# Patient Record
(demographics unavailable — no encounter records)

---

## 2025-01-30 NOTE — HISTORY OF PRESENT ILLNESS
[FreeTextEntry1] : Patient was going for fertility treatment, she is continued Metformin ER 750mg bid.  she is taking prenatal vitamins. she said she had she had IUI x 2 in October & November, but did not get pregrant. She said she had also ovary cyst on sonogram in November 2024.  she call , she is not feeling well and changed to phone visit today. She had recent lab test done in [facility], she wants to gov over her results. She said she had lost 10 pounds and curretnly 284 pounds. Patient just did lab recently, but still pending. Will make follow up phone appointment this week.

## 2025-01-30 NOTE — HISTORY OF PRESENT ILLNESS
[FreeTextEntry1] : Patient was going for fertility treatment, she is continued Metformin ER 750mg bid.  she is taking prenatal vitamins. she said she had she had IUI x 2 in October & November, but did not get pregrant. She said she had also ovary cyst on sonogram in November 2024.  she call , she is not feeling well and changed to phone visit today. She had recent lab test done in [facility], she wants to gov over her results. She said she had lost 10 pounds and curretnly 284 pounds. Patient just did lab recently, but still pending. Will make follow up phone appointment this week.

## 2025-01-30 NOTE — HISTORY OF PRESENT ILLNESS
[FreeTextEntry1] : Patient was going for fertility treatment, she is continued Metformin ER 750mg bid.  she is taking prenatal vitamins. she said she had she had IUI x 2 in October & November, but did not get pregrant. She said she had also ovary cyst on sonogram in November 2024.  She is following up for fertility with [facility] Dr. Christian Grullon. she called, she is not feeling well and changed to phone visit. She had recent lab test done in [facility], she wants to gov over her results. She said she had lost 10 pounds and currently 284 pounds Review labs today with patient, noted elevated cholesterol and borderline elevated ALT.  Patient stated she always had elevated enzymes and her other fertility provider did work up in Feb 2024. She said she has been eating every 3 hours. She is not doing intermittent fasting. Discussed she needs to eat her 3 meals a day and early afternoon snack and eat early dinner. She needs at least 3-4 hours to digest food before sleep.

## 2025-01-30 NOTE — REASON FOR VISIT
[Home] : at home, [unfilled] , at the time of the visit. [Medical Office: ([facility])___] : at the medical office located in  [Verbal consent obtained from patient] : the patient, [unfilled] [Follow - Up] : a follow-up visit [Weight Management/Obesity] : weight management/obesity [FreeTextEntry2] : Dr. Salcedo

## 2025-01-30 NOTE — HISTORY OF PRESENT ILLNESS
[FreeTextEntry1] : Patient was going for fertility treatment, she is continued Metformin ER 750mg bid.  she is taking prenatal vitamins. she said she had she had IUI x 2 in October & November, but did not get pregrant. She said she had also ovary cyst on sonogram in November 2024.  she call , she is not feeling well and changed to phone visit today. She had recent lab test done in [facility], she wants to gov over her results. She said she had lost 10 pounds and curretnly 284 pounds. Patient just did lab recently, but still pending. Will make follow up phone appointment this week.

## 2025-01-30 NOTE — ASSESSMENT
[FreeTextEntry4] : 1600 yeyo diet, exercise [FreeTextEntry1] : 1. Morbid Obesity/ PCOS 1600 yeyo diet and exercise Metformin 750mg  ER bid she is following up with infertility clinic Lab pending, will make another phone visit.

## 2025-01-30 NOTE — REVIEW OF SYSTEMS
[Recent Weight Loss (___ Lbs)] : recent weight loss: [unfilled] lbs [Heartburn] : heartburn [Negative] : Heme/Lymph

## 2025-01-30 NOTE — DATA REVIEWED
[No studies available for review at this time.] : No studies available for review at this time. [FreeTextEntry1] : recent lab done, still pending report.

## 2025-01-30 NOTE — ASSESSMENT
[Weight Loss] : weight loss [FreeTextEntry4] : 1600 yeyo diet, exercise [FreeTextEntry1] : 1. Morbid Obesity/ PCOS 1600 yeyo diet and exercise Metformin 750mg  ER bid she is following up with infertility clinic Dr. Christian Santamaria at [facility].  She will make appointment for new PCP in 6 months.

## 2025-01-30 NOTE — DATA REVIEWED
[FreeTextEntry1] : 1/25/25 [facility] chol 214, hdl 46, tirg 111, ldld 145, non hdl 168, glucose 101, fgr 129, alt 41, tsh 0.75, ft4 1.3, thyroglobulin antibodies <1, b12 494,